# Patient Record
Sex: FEMALE | Race: BLACK OR AFRICAN AMERICAN | NOT HISPANIC OR LATINO | URBAN - METROPOLITAN AREA
[De-identification: names, ages, dates, MRNs, and addresses within clinical notes are randomized per-mention and may not be internally consistent; named-entity substitution may affect disease eponyms.]

---

## 2024-04-02 PROBLEM — Z00.00 ENCOUNTER FOR PREVENTIVE HEALTH EXAMINATION: Status: ACTIVE | Noted: 2024-04-02

## 2024-04-03 ENCOUNTER — OUTPATIENT (OUTPATIENT)
Dept: OUTPATIENT SERVICES | Facility: HOSPITAL | Age: 54
LOS: 1 days | End: 2024-04-03
Payer: COMMERCIAL

## 2024-04-03 ENCOUNTER — APPOINTMENT (OUTPATIENT)
Dept: MRI IMAGING | Facility: IMAGING CENTER | Age: 54
End: 2024-04-03
Payer: COMMERCIAL

## 2024-04-03 ENCOUNTER — APPOINTMENT (OUTPATIENT)
Dept: MAMMOGRAPHY | Facility: IMAGING CENTER | Age: 54
End: 2024-04-03
Payer: COMMERCIAL

## 2024-04-03 DIAGNOSIS — Z00.8 ENCOUNTER FOR OTHER GENERAL EXAMINATION: ICD-10-CM

## 2024-04-03 PROCEDURE — G0279: CPT

## 2024-04-03 PROCEDURE — 77066 DX MAMMO INCL CAD BI: CPT

## 2024-04-03 PROCEDURE — 77066 DX MAMMO INCL CAD BI: CPT | Mod: 26

## 2024-04-03 PROCEDURE — 70551 MRI BRAIN STEM W/O DYE: CPT | Mod: 26

## 2024-04-03 PROCEDURE — 70551 MRI BRAIN STEM W/O DYE: CPT

## 2024-04-03 PROCEDURE — G0279: CPT | Mod: 26

## 2024-04-04 ENCOUNTER — APPOINTMENT (OUTPATIENT)
Dept: ULTRASOUND IMAGING | Facility: IMAGING CENTER | Age: 54
End: 2024-04-04
Payer: COMMERCIAL

## 2024-04-04 ENCOUNTER — APPOINTMENT (OUTPATIENT)
Dept: MAMMOGRAPHY | Facility: IMAGING CENTER | Age: 54
End: 2024-04-04
Payer: COMMERCIAL

## 2024-04-04 ENCOUNTER — OUTPATIENT (OUTPATIENT)
Dept: OUTPATIENT SERVICES | Facility: HOSPITAL | Age: 54
LOS: 1 days | End: 2024-04-04
Payer: COMMERCIAL

## 2024-04-04 DIAGNOSIS — Z00.8 ENCOUNTER FOR OTHER GENERAL EXAMINATION: ICD-10-CM

## 2024-04-04 PROCEDURE — 77066 DX MAMMO INCL CAD BI: CPT

## 2024-04-04 PROCEDURE — G0279: CPT | Mod: 26

## 2024-04-04 PROCEDURE — 77066 DX MAMMO INCL CAD BI: CPT | Mod: 26

## 2024-04-04 PROCEDURE — G0279: CPT

## 2024-04-04 PROCEDURE — 76641 ULTRASOUND BREAST COMPLETE: CPT | Mod: 26,50

## 2024-04-04 PROCEDURE — 76641 ULTRASOUND BREAST COMPLETE: CPT

## 2024-04-18 ENCOUNTER — OUTPATIENT (OUTPATIENT)
Dept: OUTPATIENT SERVICES | Facility: HOSPITAL | Age: 54
LOS: 1 days | End: 2024-04-18
Payer: COMMERCIAL

## 2024-04-18 ENCOUNTER — APPOINTMENT (OUTPATIENT)
Dept: CT IMAGING | Facility: IMAGING CENTER | Age: 54
End: 2024-04-18
Payer: COMMERCIAL

## 2024-04-18 ENCOUNTER — APPOINTMENT (OUTPATIENT)
Dept: ULTRASOUND IMAGING | Facility: IMAGING CENTER | Age: 54
End: 2024-04-18
Payer: COMMERCIAL

## 2024-04-18 DIAGNOSIS — R10.9 UNSPECIFIED ABDOMINAL PAIN: ICD-10-CM

## 2024-04-18 DIAGNOSIS — N63.0 UNSPECIFIED LUMP IN UNSPECIFIED BREAST: ICD-10-CM

## 2024-04-18 PROCEDURE — 88360 TUMOR IMMUNOHISTOCHEM/MANUAL: CPT | Mod: 26

## 2024-04-18 PROCEDURE — 88305 TISSUE EXAM BY PATHOLOGIST: CPT | Mod: 26

## 2024-04-18 PROCEDURE — 74176 CT ABD & PELVIS W/O CONTRAST: CPT

## 2024-04-18 PROCEDURE — 19083 BX BREAST 1ST LESION US IMAG: CPT | Mod: LT

## 2024-04-18 PROCEDURE — 77065 DX MAMMO INCL CAD UNI: CPT | Mod: 26,LT

## 2024-04-18 PROCEDURE — 19084 BX BREAST ADD LESION US IMAG: CPT | Mod: RT

## 2024-04-18 PROCEDURE — 19084 BX BREAST ADD LESION US IMAG: CPT

## 2024-04-18 PROCEDURE — 74176 CT ABD & PELVIS W/O CONTRAST: CPT | Mod: 26

## 2024-04-18 PROCEDURE — 77065 DX MAMMO INCL CAD UNI: CPT

## 2024-04-18 PROCEDURE — 77065 DX MAMMO INCL CAD UNI: CPT | Mod: 26,RT

## 2024-04-18 PROCEDURE — 88360 TUMOR IMMUNOHISTOCHEM/MANUAL: CPT

## 2024-04-18 PROCEDURE — 88305 TISSUE EXAM BY PATHOLOGIST: CPT

## 2024-04-18 PROCEDURE — 19083 BX BREAST 1ST LESION US IMAG: CPT

## 2024-04-23 LAB — SURGICAL PATHOLOGY STUDY: SIGNIFICANT CHANGE UP

## 2024-04-24 ENCOUNTER — NON-APPOINTMENT (OUTPATIENT)
Age: 54
End: 2024-04-24

## 2024-04-30 ENCOUNTER — NON-APPOINTMENT (OUTPATIENT)
Age: 54
End: 2024-04-30

## 2024-05-01 ENCOUNTER — APPOINTMENT (OUTPATIENT)
Dept: SURGICAL ONCOLOGY | Facility: CLINIC | Age: 54
End: 2024-05-01
Payer: COMMERCIAL

## 2024-05-01 ENCOUNTER — NON-APPOINTMENT (OUTPATIENT)
Age: 54
End: 2024-05-01

## 2024-05-01 VITALS
DIASTOLIC BLOOD PRESSURE: 86 MMHG | HEART RATE: 80 BPM | SYSTOLIC BLOOD PRESSURE: 130 MMHG | BODY MASS INDEX: 26.68 KG/M2 | WEIGHT: 170 LBS | OXYGEN SATURATION: 99 % | HEIGHT: 67 IN

## 2024-05-01 PROCEDURE — 99205 OFFICE O/P NEW HI 60 MIN: CPT

## 2024-05-01 NOTE — ADDENDUM
[FreeTextEntry1] : I, Selina Becker, acted solely as a scribe for Dr. Sp Ellis on this date 05/01/2024.

## 2024-05-01 NOTE — HISTORY OF PRESENT ILLNESS
[de-identified] : Ms. ANGELINE VAN is a 53 year old female who present today for initial consultation for b/l breast cancer. She reports she was told in [place] that her imaging was benign years ago.   Right retroareolar 9:00 biopsy 4/19/24: Invasive well-differentiated duct carcinoma. Measures at least 6mm. DCIS, low nuclear grade. receptors pending  Left breast 8:00 N3 biopsy 4/19/24: Invasive moderately differentiated lobular carcinoma, classical type. Measures at least 9mm. LCIS, classic type, with calcifications. receptors pending  B/L mammo/sono 4/5/24: Highly suspicious b/l masses in the right retroareolar region and left breast 8:00. US biopsies are recommended. BIRADS 5  B/L mammo 4/4/24: TC 23.68%. Architectural distortion in the right retroareolar region and questionable architectural distortion in the lower inner left breast and additional medial left breast nodularity. BIRADS 0   PMH: b/l breast cyst aspiration  Family History: breast cancer in maternal aunt.

## 2024-05-01 NOTE — PHYSICAL EXAM
[de-identified] : us shows b/l breast cancers recently biopsied, left 8:00 and right 9:00 as well as other multiple solid and cystic nodules, right 9:00 mass is palpable in the retroaroelar location, left 12:00 palpable mass likely secondary to multiple cysts/fibrocystic changes.

## 2024-05-01 NOTE — ASSESSMENT
[FreeTextEntry1] : Bilateral invasive breast cancers  ER/TX/Her 2 pending  I had a long discussion with the pt and her  regarding her diagnosis, prognosis and all management options I feel that the pt will require b/l mastectomies  w/ reconstruction but she is interested in breast conservation if possible so we have agreed to proceed with breast MRI to asses the extent of disease  Plan to discuss surgical approach once MRI is reviewed   Surgical procedures discussed in detail including lumpectomy + RT vs mastectomy w/ reconstruction  Adjuvant systemic tx discussed as well All questions answered

## 2024-05-03 ENCOUNTER — OUTPATIENT (OUTPATIENT)
Dept: OUTPATIENT SERVICES | Facility: HOSPITAL | Age: 54
LOS: 1 days | End: 2024-05-03
Payer: COMMERCIAL

## 2024-05-03 ENCOUNTER — APPOINTMENT (OUTPATIENT)
Dept: MRI IMAGING | Facility: IMAGING CENTER | Age: 54
End: 2024-05-03
Payer: COMMERCIAL

## 2024-05-03 DIAGNOSIS — C50.919 MALIGNANT NEOPLASM OF UNSPECIFIED SITE OF UNSPECIFIED FEMALE BREAST: ICD-10-CM

## 2024-05-03 PROCEDURE — 77049 MRI BREAST C-+ W/CAD BI: CPT | Mod: 26

## 2024-05-03 PROCEDURE — C8937: CPT

## 2024-05-03 PROCEDURE — C8908: CPT

## 2024-05-08 ENCOUNTER — NON-APPOINTMENT (OUTPATIENT)
Age: 54
End: 2024-05-08

## 2024-05-17 ENCOUNTER — RESULT REVIEW (OUTPATIENT)
Age: 54
End: 2024-05-17

## 2024-05-17 ENCOUNTER — APPOINTMENT (OUTPATIENT)
Dept: ULTRASOUND IMAGING | Facility: IMAGING CENTER | Age: 54
End: 2024-05-17
Payer: COMMERCIAL

## 2024-05-17 ENCOUNTER — OUTPATIENT (OUTPATIENT)
Dept: OUTPATIENT SERVICES | Facility: HOSPITAL | Age: 54
LOS: 1 days | End: 2024-05-17
Payer: COMMERCIAL

## 2024-05-17 DIAGNOSIS — C50.919 MALIGNANT NEOPLASM OF UNSPECIFIED SITE OF UNSPECIFIED FEMALE BREAST: ICD-10-CM

## 2024-05-17 PROCEDURE — 19083 BX BREAST 1ST LESION US IMAG: CPT | Mod: LT

## 2024-05-17 PROCEDURE — 19286 PERQ DEV BREAST ADD US IMAG: CPT | Mod: RT

## 2024-05-17 PROCEDURE — 19286 PERQ DEV BREAST ADD US IMAG: CPT

## 2024-05-17 PROCEDURE — 19285 PERQ DEV BREAST 1ST US IMAG: CPT | Mod: 59,LT

## 2024-05-17 PROCEDURE — 77065 DX MAMMO INCL CAD UNI: CPT | Mod: 26,RT

## 2024-05-17 PROCEDURE — 77065 DX MAMMO INCL CAD UNI: CPT

## 2024-05-17 PROCEDURE — 77065 DX MAMMO INCL CAD UNI: CPT | Mod: 26,LT

## 2024-05-17 PROCEDURE — 19083 BX BREAST 1ST LESION US IMAG: CPT

## 2024-05-17 PROCEDURE — 88305 TISSUE EXAM BY PATHOLOGIST: CPT | Mod: 26

## 2024-05-17 PROCEDURE — 19285 PERQ DEV BREAST 1ST US IMAG: CPT

## 2024-05-17 PROCEDURE — 88305 TISSUE EXAM BY PATHOLOGIST: CPT

## 2024-05-17 PROCEDURE — A4648: CPT

## 2024-05-21 ENCOUNTER — OUTPATIENT (OUTPATIENT)
Dept: OUTPATIENT SERVICES | Facility: HOSPITAL | Age: 54
LOS: 1 days | End: 2024-05-21
Payer: COMMERCIAL

## 2024-05-21 VITALS
HEART RATE: 68 BPM | SYSTOLIC BLOOD PRESSURE: 122 MMHG | WEIGHT: 175.93 LBS | TEMPERATURE: 98 F | DIASTOLIC BLOOD PRESSURE: 85 MMHG | RESPIRATION RATE: 16 BRPM | HEIGHT: 67 IN | OXYGEN SATURATION: 100 %

## 2024-05-21 DIAGNOSIS — C50.919 MALIGNANT NEOPLASM OF UNSPECIFIED SITE OF UNSPECIFIED FEMALE BREAST: ICD-10-CM

## 2024-05-21 DIAGNOSIS — Z98.890 OTHER SPECIFIED POSTPROCEDURAL STATES: Chronic | ICD-10-CM

## 2024-05-21 LAB
GIANT PLATELETS BLD QL SMEAR: PRESENT — SIGNIFICANT CHANGE UP
HCG SERPL-ACNC: <1 MIU/ML — SIGNIFICANT CHANGE UP
HCT VFR BLD CALC: 40.5 % — SIGNIFICANT CHANGE UP (ref 34.5–45)
HGB BLD-MCNC: 12.5 G/DL — SIGNIFICANT CHANGE UP (ref 11.5–15.5)
MANUAL SMEAR VERIFICATION: SIGNIFICANT CHANGE UP
MCHC RBC-ENTMCNC: 25.4 PG — LOW (ref 27–34)
MCHC RBC-ENTMCNC: 30.9 GM/DL — LOW (ref 32–36)
MCV RBC AUTO: 82.3 FL — SIGNIFICANT CHANGE UP (ref 80–100)
NRBC # BLD: 0 /100 WBCS — SIGNIFICANT CHANGE UP (ref 0–0)
NRBC # FLD: 0 K/UL — SIGNIFICANT CHANGE UP (ref 0–0)
PLAT MORPH BLD: ABNORMAL
PLATELET # BLD AUTO: 59 K/UL — LOW (ref 150–400)
PLATELET COUNT - ESTIMATE: ABNORMAL
RBC # BLD: 4.92 M/UL — SIGNIFICANT CHANGE UP (ref 3.8–5.2)
RBC # FLD: 19.3 % — HIGH (ref 10.3–14.5)
RBC BLD AUTO: NORMAL — SIGNIFICANT CHANGE UP
WBC # BLD: 7.84 K/UL — SIGNIFICANT CHANGE UP (ref 3.8–10.5)
WBC # FLD AUTO: 7.84 K/UL — SIGNIFICANT CHANGE UP (ref 3.8–10.5)

## 2024-05-21 RX ORDER — SODIUM CHLORIDE 9 MG/ML
1000 INJECTION, SOLUTION INTRAVENOUS
Refills: 0 | Status: DISCONTINUED | OUTPATIENT
Start: 2024-05-28 | End: 2024-06-11

## 2024-05-21 NOTE — H&P PST ADULT - ADDITIONAL PE
DX: malignant neoplasm of female breast; DX: right breast mass at 9 o'clock, firm, nontender, no nipple discharge, left breast biopsy site with steri strips in place, malignant neoplasm of female breast;

## 2024-05-21 NOTE — H&P PST ADULT - NS HP PST ANES REACTION
[FreeTextEntry8] : Pt is here to  have work form filled out. Patient reports feeling well today, denies any acute complaints. 
No

## 2024-05-21 NOTE — H&P PST ADULT - PROBLEM SELECTOR PLAN 1
pt scheduled for bilateral lumpectomy mag seed localization bilateral axillary sentinel lymph node biopsy on 05/28/24  Preop instructions provided. Pt verbalized understanding.   Pepcid for GI prophylaxis with written and verbal instruction provided    written and verbal instructions with teach back on chlorhexidine shampoo provided,  pt verbalized understanding   h/o anemia, CBC done at PST, Hcg done @PST

## 2024-05-21 NOTE — H&P PST ADULT - NSICDXFAMILYHX_GEN_ALL_CORE_FT
FAMILY HISTORY:  No pertinent family history in first degree relatives FAMILY HISTORY:  Aunt  Still living? Unknown  FH: breast cancer, Age at diagnosis: Age Unknown

## 2024-05-21 NOTE — H&P PST ADULT - HISTORY OF PRESENT ILLNESS
53 y.o. female with h/o anemia, presents to PST for evaluation scheduled for bilateral lumpectomy mag seed localization bilateral axillary sentinel lymph node biopsy, reports h/lo abnormal mammogram in 04/2024, followed by breast ultrasound, repeat mammogram, breast MRI and biopsy, preop diagnosis malignant neoplasm of female breast, denies breast tenderness, nipple discharge, fatigue, weight loss 53 y.o. female with h/o anemia, presents to [facility] for evaluation scheduled for bilateral lumpectomy mag seed localization bilateral axillary sentinel lymph node biopsy, reports noted bilateral breast lump, s/p abnormal mammogram in 04/2024, followed by breast ultrasound, repeat mammogram, breast MRI and biopsy, preop diagnosis malignant neoplasm of female breast, denies breast tenderness, nipple discharge, fatigue, weight loss

## 2024-05-24 PROBLEM — C50.919 MALIGNANT NEOPLASM OF UNSPECIFIED SITE OF UNSPECIFIED FEMALE BREAST: Chronic | Status: ACTIVE | Noted: 2024-05-21

## 2024-05-24 PROBLEM — D64.9 ANEMIA, UNSPECIFIED: Chronic | Status: ACTIVE | Noted: 2024-05-21

## 2024-05-27 ENCOUNTER — TRANSCRIPTION ENCOUNTER (OUTPATIENT)
Age: 54
End: 2024-05-27

## 2024-05-28 ENCOUNTER — OUTPATIENT (OUTPATIENT)
Dept: OUTPATIENT SERVICES | Facility: HOSPITAL | Age: 54
LOS: 1 days | Discharge: HOME | End: 2024-05-28
Payer: COMMERCIAL

## 2024-05-28 ENCOUNTER — APPOINTMENT (OUTPATIENT)
Dept: NUCLEAR MEDICINE | Facility: HOSPITAL | Age: 54
End: 2024-05-28

## 2024-05-28 ENCOUNTER — RESULT REVIEW (OUTPATIENT)
Age: 54
End: 2024-05-28

## 2024-05-28 ENCOUNTER — APPOINTMENT (OUTPATIENT)
Dept: SURGICAL ONCOLOGY | Facility: HOSPITAL | Age: 54
End: 2024-05-28

## 2024-05-28 ENCOUNTER — TRANSCRIPTION ENCOUNTER (OUTPATIENT)
Age: 54
End: 2024-05-28

## 2024-05-28 VITALS
RESPIRATION RATE: 14 BRPM | WEIGHT: 175.93 LBS | TEMPERATURE: 99 F | HEART RATE: 71 BPM | SYSTOLIC BLOOD PRESSURE: 107 MMHG | HEIGHT: 67 IN | OXYGEN SATURATION: 100 % | DIASTOLIC BLOOD PRESSURE: 69 MMHG

## 2024-05-28 VITALS
OXYGEN SATURATION: 99 % | SYSTOLIC BLOOD PRESSURE: 123 MMHG | DIASTOLIC BLOOD PRESSURE: 81 MMHG | RESPIRATION RATE: 15 BRPM | HEART RATE: 83 BPM

## 2024-05-28 DIAGNOSIS — C50.919 MALIGNANT NEOPLASM OF UNSPECIFIED SITE OF UNSPECIFIED FEMALE BREAST: ICD-10-CM

## 2024-05-28 DIAGNOSIS — Z98.890 OTHER SPECIFIED POSTPROCEDURAL STATES: Chronic | ICD-10-CM

## 2024-05-28 LAB — HCG UR QL: NEGATIVE — SIGNIFICANT CHANGE UP

## 2024-05-28 PROCEDURE — 14001 TIS TRNFR TRUNK 10.1-30SQCM: CPT

## 2024-05-28 PROCEDURE — 19301 PARTIAL MASTECTOMY: CPT | Mod: 50

## 2024-05-28 PROCEDURE — 76098 X-RAY EXAM SURGICAL SPECIMEN: CPT | Mod: 26,76

## 2024-05-28 PROCEDURE — 88305 TISSUE EXAM BY PATHOLOGIST: CPT | Mod: 26

## 2024-05-28 PROCEDURE — 88307 TISSUE EXAM BY PATHOLOGIST: CPT | Mod: 26

## 2024-05-28 PROCEDURE — 38900 IO MAP OF SENT LYMPH NODE: CPT | Mod: 50

## 2024-05-28 PROCEDURE — 88342 IMHCHEM/IMCYTCHM 1ST ANTB: CPT | Mod: 26

## 2024-05-28 PROCEDURE — 38792 RA TRACER ID OF SENTINL NODE: CPT | Mod: 50,59

## 2024-05-28 PROCEDURE — 38525 BIOPSY/REMOVAL LYMPH NODES: CPT | Mod: 50

## 2024-05-28 DEVICE — SURGICEL FIBRILLAR 2 X 4": Type: IMPLANTABLE DEVICE | Site: BILATERAL | Status: FUNCTIONAL

## 2024-05-28 DEVICE — ARISTA 3GR: Type: IMPLANTABLE DEVICE | Site: BILATERAL | Status: FUNCTIONAL

## 2024-05-28 RX ORDER — OXYCODONE HYDROCHLORIDE 5 MG/1
10 TABLET ORAL ONCE
Refills: 0 | Status: DISCONTINUED | OUTPATIENT
Start: 2024-05-28 | End: 2024-05-28

## 2024-05-28 RX ORDER — OXYCODONE HYDROCHLORIDE 5 MG/1
1 TABLET ORAL
Qty: 24 | Refills: 0
Start: 2024-05-28 | End: 2024-05-31

## 2024-05-28 RX ORDER — HYDROMORPHONE HYDROCHLORIDE 2 MG/ML
0.5 INJECTION INTRAMUSCULAR; INTRAVENOUS; SUBCUTANEOUS
Refills: 0 | Status: DISCONTINUED | OUTPATIENT
Start: 2024-05-28 | End: 2024-05-28

## 2024-05-28 RX ORDER — ONDANSETRON 8 MG/1
4 TABLET, FILM COATED ORAL ONCE
Refills: 0 | Status: DISCONTINUED | OUTPATIENT
Start: 2024-05-28 | End: 2024-06-11

## 2024-05-28 RX ORDER — OXYCODONE HYDROCHLORIDE 5 MG/1
5 TABLET ORAL ONCE
Refills: 0 | Status: DISCONTINUED | OUTPATIENT
Start: 2024-05-28 | End: 2024-05-28

## 2024-05-28 RX ORDER — HYDROMORPHONE HYDROCHLORIDE 2 MG/ML
1 INJECTION INTRAMUSCULAR; INTRAVENOUS; SUBCUTANEOUS
Refills: 0 | Status: DISCONTINUED | OUTPATIENT
Start: 2024-05-28 | End: 2024-05-28

## 2024-05-28 RX ADMIN — OXYCODONE HYDROCHLORIDE 5 MILLIGRAM(S): 5 TABLET ORAL at 20:45

## 2024-05-28 RX ADMIN — SODIUM CHLORIDE 30 MILLILITER(S): 9 INJECTION, SOLUTION INTRAVENOUS at 18:56

## 2024-05-28 RX ADMIN — OXYCODONE HYDROCHLORIDE 5 MILLIGRAM(S): 5 TABLET ORAL at 20:00

## 2024-05-28 NOTE — ASU DISCHARGE PLAN (ADULT/PEDIATRIC) - NS MD DC FALL RISK RISK
For information on Fall & Injury Prevention, visit: [email or web address] OR  [email or web address] OR  https://www.cdc.gov/steadi/patient.html

## 2024-05-28 NOTE — BRIEF OPERATIVE NOTE - NSICDXBRIEFPROCEDURE_GEN_ALL_CORE_FT
PROCEDURES:  Lumpectomy, breast, bilateral, with sentinel lymph node biopsy 28-May-2024 18:15:03  Ariel Sharma

## 2024-05-28 NOTE — ASU DISCHARGE PLAN (ADULT/PEDIATRIC) - ASU DC SPECIAL INSTRUCTIONSFT
Breast Biopsy/Lumpectomy Post-operative Instructions    Supportive bra-  You should wear the supportive bra continuously for 1 week after the procedure, including wearing it to sleep.  Thereafter, you may wear your regular bra.  You may remove the bra to shower.  The sports bra will provide support, decrease the amount of swelling at the lumpectomy site, and make your recovery more comfortable.    Showering/Bathing- You may shower starting today. Allow the water to run over the wound, but don not scrub the area.  It is best not to sit in a bathtub or swimming pool for at least one week after surgery.    Activity level- You may resume most normal daily activity as tolerated, but avoid strenuous activities such as aerobics, jogging, exercising or heavy lifting for at least 1 week after surgery.  You may return to work in 1-2 days after surgery.  You may drive as long as you are not taking any prescription pain medication.    Pain Medication- You may take extra-strength Tylenol as needed. Take oxycodone 5mg every 4-6 hours for severe pain. Please don't take aspirin, Motrin, Advil or any other anti-inflammatory medications, as these medications may cause bleeding or bruising.    Follow-up Appointment- Please call the office to schedule your post-operative appointment which should be approximately 10 days after your surgery.     Bruising/Bleeding/Swelling- It is normal for there to be some bruising and swelling at the breast biopsy site, and there may be some staining of blood on to the dressing.  Some discomfort at the surgical site is expected.  If your symptoms seem excessive, or if you have any question or concerns, please call the office.

## 2024-05-28 NOTE — ASU DISCHARGE PLAN (ADULT/PEDIATRIC) - CARE PROVIDER_API CALL
Sp Ellis [facility]  [address] [place] [id number]  Phone: [phone]  Fax: [phone]  Established Patient  Follow Up Time: 2 weeks

## 2024-05-28 NOTE — ASU DISCHARGE PLAN (ADULT/PEDIATRIC) - NURSING INSTRUCTIONS
You received IV Tylenol for pain management at 3:15pm. Please DO NOT take any Tylenol (Acetaminophen) containing products, such as Vicodin, Percocet, Excedrin, and cold medications for the next 6 hours (until 9:15 PM). DO NOT TAKE MORE THAN 3000 MG OF TYLENOL in a 24 hour period.

## 2024-06-04 ENCOUNTER — TRANSCRIPTION ENCOUNTER (OUTPATIENT)
Age: 54
End: 2024-06-04

## 2024-06-05 ENCOUNTER — APPOINTMENT (OUTPATIENT)
Dept: SURGICAL ONCOLOGY | Facility: CLINIC | Age: 54
End: 2024-06-05
Payer: COMMERCIAL

## 2024-06-05 VITALS
OXYGEN SATURATION: 98 % | HEART RATE: 76 BPM | DIASTOLIC BLOOD PRESSURE: 86 MMHG | SYSTOLIC BLOOD PRESSURE: 134 MMHG | WEIGHT: 170 LBS | HEIGHT: 67 IN | BODY MASS INDEX: 26.68 KG/M2

## 2024-06-05 PROCEDURE — 99024 POSTOP FOLLOW-UP VISIT: CPT

## 2024-06-05 NOTE — CONSULT LETTER
[Dear  ___] : Dear  [unfilled], [Consult Letter:] : I had the pleasure of evaluating your patient, [unfilled]. [Please see my note below.] : Please see my note below. [Sincerely,] : Sincerely, [FreeTextEntry3] : Sp Ellis MD FACS

## 2024-06-05 NOTE — HISTORY OF PRESENT ILLNESS
[de-identified] : Ms. ANGELINE VAN is a 53 year old female who presents a post op for for b/l breast cancer. Today she presents a post op visit for bilateral lumpectomies on 5/28/24. Pathology is pending.   Breast MRI 5/3/24- Bilateral enhancing breast masses with associated biopsy markers corresponding to known breast cancers. Additional suspicious mass in the lower inner posterior left breast, 2.0 cm inferior to index carcinoma at 8:00, to be included within a wide excision. If it would alter management, additional ultrasound guided core biopsy at the 8:00 position can be performed.   She reports she was told in [place] that her imaging was benign years ago.   Right retroareolar 9:00 biopsy 4/19/24: Invasive well-differentiated duct carcinoma. Measures at least 6mm. DCIS, low nuclear grade. receptors pending  Left breast 8:00 N3 biopsy 4/19/24: Invasive moderately differentiated lobular carcinoma, classical type. Measures at least 9mm. LCIS, classic type, with calcifications. receptors pending  B/L mammo/sono 4/5/24: Highly suspicious b/l masses in the right retroareolar region and left breast 8:00. US biopsies are recommended. BIRADS 5  B/L mammo 4/4/24: TC 23.68%. Architectural distortion in the right retroareolar region and questionable architectural distortion in the lower inner left breast and additional medial left breast nodularity. BIRADS 0   PMH: b/l breast cyst aspiration  Family History: breast cancer in maternal aunt.

## 2024-06-05 NOTE — ASSESSMENT
[FreeTextEntry1] : Bilateral invasive breast cancers  S/p b/l lumpectomies/SN bx Follow up with pathology Discussed the risk for re-excision given extent of disease Adjuvant radiation therapy and systemic tx discussed RTO 3 months or prn pending path

## 2024-06-05 NOTE — PHYSICAL EXAM
[Normal] : supple, no neck mass and thyroid not enlarged [Normal Neck Lymph Nodes] : normal neck lymph nodes  [Normal Supraclavicular Lymph Nodes] : normal supraclavicular lymph nodes [Normal Groin Lymph Nodes] : normal groin lymph nodes [Normal Axillary Lymph Nodes] : normal axillary lymph nodes [Normal] : oriented to person, place and time, with appropriate affect [de-identified] : bilateral breast lumpectomy and axillary incisions healing well w/ no evidence of infection

## 2024-06-05 NOTE — ADDENDUM
[FreeTextEntry1] : I, Selina Becker, acted solely as a scribe for Dr. Sp Ellis on this date 06/05/2024.

## 2024-06-06 LAB — SURGICAL PATHOLOGY STUDY: SIGNIFICANT CHANGE UP

## 2024-06-17 ENCOUNTER — NON-APPOINTMENT (OUTPATIENT)
Age: 54
End: 2024-06-17

## 2024-06-20 ENCOUNTER — OUTPATIENT (OUTPATIENT)
Dept: OUTPATIENT SERVICES | Facility: HOSPITAL | Age: 54
LOS: 1 days | Discharge: HOME | End: 2024-06-20
Payer: COMMERCIAL

## 2024-06-20 DIAGNOSIS — Z98.890 OTHER SPECIFIED POSTPROCEDURAL STATES: Chronic | ICD-10-CM

## 2024-06-24 ENCOUNTER — NON-APPOINTMENT (OUTPATIENT)
Age: 54
End: 2024-06-24

## 2024-06-24 ENCOUNTER — APPOINTMENT (OUTPATIENT)
Dept: RADIATION ONCOLOGY | Facility: CLINIC | Age: 54
End: 2024-06-24
Payer: COMMERCIAL

## 2024-06-24 VITALS
DIASTOLIC BLOOD PRESSURE: 79 MMHG | OXYGEN SATURATION: 100 % | RESPIRATION RATE: 17 BRPM | WEIGHT: 184.5 LBS | SYSTOLIC BLOOD PRESSURE: 125 MMHG | BODY MASS INDEX: 28.96 KG/M2 | TEMPERATURE: 97 F | HEIGHT: 67 IN | HEART RATE: 69 BPM

## 2024-06-24 DIAGNOSIS — E61.1 IRON DEFICIENCY: ICD-10-CM

## 2024-06-24 DIAGNOSIS — Z80.3 FAMILY HISTORY OF MALIGNANT NEOPLASM OF BREAST: ICD-10-CM

## 2024-06-24 DIAGNOSIS — Z23 ENCOUNTER FOR IMMUNIZATION: ICD-10-CM

## 2024-06-24 DIAGNOSIS — C50.919 MALIGNANT NEOPLASM OF UNSPECIFIED SITE OF UNSPECIFIED FEMALE BREAST: ICD-10-CM

## 2024-06-24 PROCEDURE — 99205 OFFICE O/P NEW HI 60 MIN: CPT | Mod: GC

## 2024-06-24 RX ORDER — OXYCODONE 5 MG/1
5 TABLET ORAL
Qty: 24 | Refills: 0 | Status: DISCONTINUED | COMMUNITY
Start: 2024-05-28

## 2024-06-24 RX ORDER — IRON/IRON ASP GLY/FA/MV-MIN 38 125-25-1MG
TABLET ORAL
Refills: 0 | Status: ACTIVE | COMMUNITY

## 2024-07-16 ENCOUNTER — NON-APPOINTMENT (OUTPATIENT)
Age: 54
End: 2024-07-16

## 2024-07-17 ENCOUNTER — APPOINTMENT (OUTPATIENT)
Dept: HEMATOLOGY ONCOLOGY | Facility: CLINIC | Age: 54
End: 2024-07-17
Payer: COMMERCIAL

## 2024-07-17 VITALS
HEART RATE: 74 BPM | HEIGHT: 67.32 IN | SYSTOLIC BLOOD PRESSURE: 127 MMHG | OXYGEN SATURATION: 99 % | DIASTOLIC BLOOD PRESSURE: 82 MMHG | BODY MASS INDEX: 27.7 KG/M2 | WEIGHT: 178.57 LBS | TEMPERATURE: 97.2 F | RESPIRATION RATE: 16 BRPM

## 2024-07-17 DIAGNOSIS — C50.919 MALIGNANT NEOPLASM OF UNSPECIFIED SITE OF UNSPECIFIED FEMALE BREAST: ICD-10-CM

## 2024-07-17 PROCEDURE — G2211 COMPLEX E/M VISIT ADD ON: CPT

## 2024-07-17 PROCEDURE — 99205 OFFICE O/P NEW HI 60 MIN: CPT

## 2024-07-24 ENCOUNTER — APPOINTMENT (OUTPATIENT)
Dept: HEMATOLOGY ONCOLOGY | Facility: CLINIC | Age: 54
End: 2024-07-24

## 2024-07-26 DIAGNOSIS — C50.811 MALIGNANT NEOPLASM OF OVERLAPPING SITES OF RIGHT FEMALE BREAST: ICD-10-CM

## 2024-07-26 DIAGNOSIS — C50.912 MALIGNANT NEOPLASM OF UNSPECIFIED SITE OF LEFT FEMALE BREAST: ICD-10-CM

## 2024-07-29 ENCOUNTER — NON-APPOINTMENT (OUTPATIENT)
Age: 54
End: 2024-07-29

## 2024-07-29 PROCEDURE — 77333 RADIATION TREATMENT AID(S): CPT | Mod: 26

## 2024-07-29 PROCEDURE — 77263 THER RADIOLOGY TX PLNG CPLX: CPT

## 2024-07-29 PROCEDURE — 77290 THER RAD SIMULAJ FIELD CPLX: CPT | Mod: 26

## 2024-07-31 ENCOUNTER — APPOINTMENT (OUTPATIENT)
Dept: HEMATOLOGY ONCOLOGY | Facility: CLINIC | Age: 54
End: 2024-07-31

## 2024-07-31 VITALS
BODY MASS INDEX: 28.21 KG/M2 | DIASTOLIC BLOOD PRESSURE: 69 MMHG | TEMPERATURE: 98.1 F | RESPIRATION RATE: 15 BRPM | OXYGEN SATURATION: 98 % | HEART RATE: 61 BPM | WEIGHT: 181.88 LBS | SYSTOLIC BLOOD PRESSURE: 104 MMHG

## 2024-07-31 DIAGNOSIS — C50.919 MALIGNANT NEOPLASM OF UNSPECIFIED SITE OF UNSPECIFIED FEMALE BREAST: ICD-10-CM

## 2024-07-31 PROCEDURE — G2211 COMPLEX E/M VISIT ADD ON: CPT

## 2024-07-31 PROCEDURE — 99214 OFFICE O/P EST MOD 30 MIN: CPT

## 2024-07-31 RX ORDER — TAMOXIFEN CITRATE 20 MG/1
20 TABLET, FILM COATED ORAL DAILY
Qty: 1 | Refills: 1 | Status: ACTIVE | COMMUNITY
Start: 2024-07-31 | End: 1900-01-01

## 2024-08-05 PROCEDURE — 77300 RADIATION THERAPY DOSE PLAN: CPT | Mod: 26

## 2024-08-05 PROCEDURE — 77334 RADIATION TREATMENT AID(S): CPT | Mod: 26

## 2024-08-05 PROCEDURE — 77295 3-D RADIOTHERAPY PLAN: CPT | Mod: 26

## 2024-08-08 NOTE — ASSESSMENT
[FreeTextEntry1] : In summary, Ms. ANGELINE VAN is a 53 year old premenopausal female with bilateral stage IA breast ca. She is status post bilateral lumpectomies 5/2024 and has met with Dr Harp. L breast: Invasive lobular carcinoma, classic type, pT1bN0. Oncotype 10 R breast- Invasive ductal carcinoma, well-differentiated, pT1cN0. Oncotype 10  - I discussed the treatment for stage I breast cancer with the patient including the role of chemotherapy, radiation therapy and endocrine therapy. I discussed the role of Oncotype DX recurrence score in deciding the benefit from chemotherapy as well as its prognostic significance if the score is <10.   - Both right and left lumpectomy specimens resulted with an Oncotype of 10. Discussed patient will not need chemotherapy. Planning for bilateral breast RT with Dr. Harp. Can proceed with RT then will plan to start tamoxifen 2 weeks following RT.   - will discuss genetics at next visit, due to BL cancers and young age  I discussed the side effects of tamoxifen including but not limited to hot flashes, mood changes, fluid retention, vaginal dryness. cataracts, thromboembolic events, stroke, cardiovascular side effects and the risk of endometrial cancer.   I reviewed that tamoxifen may stop her periods or can cause irregular bleeding. She is done with child bearing. I reviewed teratogenic effects of tamoxifen. She will continue to use non hormonal  barrier contraception to prevent pregnancy. No OCP use. She will followup with her gynecologist every 6-12 months and report any unusual vaginal bleeding or spotting. I recommended her to see an ophthalmologist annually for cataract monitoring. Patient is a nonsmoker and has no cardiovascular risk factors. She has no personal or family history of coagulation disorders.  She will continue to follow with her primary care physician for evaluation and management of risk factors for stroke. I educated about signs and symptoms of DVT/PE. Patient will report if she develops acute onset chest pain shortness of breath, leg swelling or calf pain. Rec to take ASA 81. She understands the thrombotic risk and will hold tamoxifen prior to surgery or prolonged travel.  After understanding the risks and benefits of tamoxifen, patient has decided to start tamoxifen. She will continue annual mammogram. I will see her back in 3 months or sooner if she develops any side effects The patient had plenty of time to ask questions and all of her questions were answered to her satisfaction. I gave her my office phone number and encouraged her to call with any questions or additional information.   - RTC 4 months

## 2024-08-08 NOTE — HISTORY OF PRESENT ILLNESS
[ECOG Performance Status: 0 - Fully active, able to carry on all pre-disease performance without restriction] : Performance Status: 0 - Fully active, able to carry on all pre-disease performance without restriction [de-identified] : In summary, Ms. ANGELINE VAN is a 53 year old premenopausal female with bilateral stage IA breast ca. She is status post bilateral lumpectomies 5/2024 and has met with Dr Harp. L breast: Invasive lobular carcinoma, classic type, pT1bN0. Oncotype 10 R breast- Invasive ductal carcinoma, well-differentiated, pT1cN0. Oncotype 10  Patient presents today for a routine follow-up. She is here to review her Oncotype Dx recurrence score.  RS low on both sides no benefit from chemo rec tamoxifen 20 mg daily x 10 yrs s/e reviewed plan to start davis after RT will discuss genetics at next visit, due to BL cancers and young age  [de-identified] : Ms. Kuhn is a 53 year old woman presenting to us today with newly diagnosed Anatomic/Prognostic Stage IA, L breast pT1bN0 Invasive lobular carcinoma and R breast pT1cN0 Invasive ductal carcinoma. Here for consideration of RT.  Patient had benign breast imaging years prior in [place].  B/L mammo 4/4/24: TC 23.68%. Architectural distortion in the right retroareolar region and questionable architectural distortion in the lower inner left breast and additional medial left breast nodularity 2.5 cm in size.  B/L mammo/sono 4/5/24: Highly suspicious b/l masses in the right retroareolar region and left breast 8:00. US biopsies are recommended. BIRADS 5  Right retroareolar 9:00 biopsy 4/19/24: Invasive well-differentiated duct carcinoma ER/VT +ve, HER2 -ve. [place] 5. Measures at least 6mm. DCIS, low nuclear grade. Left breast 8:00 N3 biopsy 4/19/24: Invasive moderately differentiated lobular carcinoma ER/VT +ve, HER2 -ve, classical type. [place] 6. Measures at least 9mm. LCIS, classic type, with calcifications.  Breast MRI 5/3/24- Bilateral enhancing breast masses with associated biopsy markers corresponding to known breast cancers. Additional suspicious mass in the lower inner posterior left breast, 2.0 cm inferior to index carcinoma at 8:00  5/29 Bilateral lumpectomies: L breast 8:00 lumpectomy- Invasive lobular carcinoma, classic type, moderately-differentiated, 9 mm in size. grade 2, no LVI, margins negative. 0/5 sentinel lymph nodes +ve. LCIS with calcifications, LCIS at margins. pT1bN0 R breast 9:00 lumpectomy- Invasive ductal carcinoma, well-differentiated, 18 mm in size. grade 1, no LVI, margins negative. 0/4 sentinel nodes +ve. DCIS, cribriform and micropapillary types, intermediate grade. pT1cN0.  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  She has irregular and heavy bleeding, periods LMP 7/2024 Iron def anemia brca rec.

## 2024-08-08 NOTE — HISTORY OF PRESENT ILLNESS
[ECOG Performance Status: 0 - Fully active, able to carry on all pre-disease performance without restriction] : Performance Status: 0 - Fully active, able to carry on all pre-disease performance without restriction [de-identified] : In summary, Ms. ANGELINE VAN is a 53 year old premenopausal female with bilateral stage IA breast ca. She is status post bilateral lumpectomies 5/2024 and has met with Dr Harp. L breast: Invasive lobular carcinoma, classic type, pT1bN0. Oncotype 10 R breast- Invasive ductal carcinoma, well-differentiated, pT1cN0. Oncotype 10  Patient presents today for a routine follow-up. She is here to review her Oncotype Dx recurrence score.  RS low on both sides no benefit from chemo rec tamoxifen 20 mg daily x 10 yrs s/e reviewed plan to start davis after RT will discuss genetics at next visit, due to BL cancers and young age  [de-identified] : Ms. Kuhn is a 53 year old woman presenting to us today with newly diagnosed Anatomic/Prognostic Stage IA, L breast pT1bN0 Invasive lobular carcinoma and R breast pT1cN0 Invasive ductal carcinoma. Here for consideration of RT.  Patient had benign breast imaging years prior in [place].  B/L mammo 4/4/24: TC 23.68%. Architectural distortion in the right retroareolar region and questionable architectural distortion in the lower inner left breast and additional medial left breast nodularity 2.5 cm in size.  B/L mammo/sono 4/5/24: Highly suspicious b/l masses in the right retroareolar region and left breast 8:00. US biopsies are recommended. BIRADS 5  Right retroareolar 9:00 biopsy 4/19/24: Invasive well-differentiated duct carcinoma ER/NC +ve, HER2 -ve. [place] 5. Measures at least 6mm. DCIS, low nuclear grade. Left breast 8:00 N3 biopsy 4/19/24: Invasive moderately differentiated lobular carcinoma ER/NC +ve, HER2 -ve, classical type. [place] 6. Measures at least 9mm. LCIS, classic type, with calcifications.  Breast MRI 5/3/24- Bilateral enhancing breast masses with associated biopsy markers corresponding to known breast cancers. Additional suspicious mass in the lower inner posterior left breast, 2.0 cm inferior to index carcinoma at 8:00  5/29 Bilateral lumpectomies: L breast 8:00 lumpectomy- Invasive lobular carcinoma, classic type, moderately-differentiated, 9 mm in size. grade 2, no LVI, margins negative. 0/5 sentinel lymph nodes +ve. LCIS with calcifications, LCIS at margins. pT1bN0 R breast 9:00 lumpectomy- Invasive ductal carcinoma, well-differentiated, 18 mm in size. grade 1, no LVI, margins negative. 0/4 sentinel nodes +ve. DCIS, cribriform and micropapillary types, intermediate grade. pT1cN0.  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

## 2024-08-08 NOTE — PHYSICAL EXAM
[Fully active, able to carry on all pre-disease performance without restriction] : Status 0 - Fully active, able to carry on all pre-disease performance without restriction [Normal] : affect appropriate [de-identified] : BL healing lumpectomy scars

## 2024-08-08 NOTE — PHYSICAL EXAM
[Fully active, able to carry on all pre-disease performance without restriction] : Status 0 - Fully active, able to carry on all pre-disease performance without restriction [Normal] : affect appropriate [de-identified] : BL healing lumpectomy scars

## 2024-08-12 ENCOUNTER — NON-APPOINTMENT (OUTPATIENT)
Age: 54
End: 2024-08-12

## 2024-08-12 PROCEDURE — 77280 THER RAD SIMULAJ FIELD SMPL: CPT | Mod: 26

## 2024-08-13 PROCEDURE — 77387C: CUSTOM

## 2024-08-13 PROCEDURE — 77427 RADIATION TX MANAGEMENT X5: CPT

## 2024-08-13 NOTE — PHYSICAL EXAM
[Normal] : normal skin color and pigmentation and no rash [de-identified] : The breasts symmetric in size, shape, and contour. There is a well healed bilateral medial circumareolar and axillary scars. The breasts are without palpable masses, nipple discharge or suspicious skin changes.

## 2024-08-13 NOTE — DISEASE MANAGEMENT
[Pathological] : TNM Stage: p [IA] : IA [TTNM] : 1b/c [NTNM] : 0 [MTNM] : 0 [de-identified] : 283 [de-identified] : [id number] [de-identified] : Bilateral breasts

## 2024-08-13 NOTE — HISTORY OF PRESENT ILLNESS
[FreeTextEntry1] : Ms. Srinivasa Bhat is a 53-year-old woman diagnosed with Anatomic and Prognostic Stage IA pT1bN0 invasive lobular carcinoma of the left lower inner quadrant of the breast ER+ 90% ID+ 99% HER2- and synchronous Anatomic and Prognostic Stage IA pT1cN0 invasive ductal carcinoma of the right retroareolar breast ER+ 90% ID+ 99% HER2- s/p bilateral lumpectomy and SLNB on 5/28/24. Pathology for the left breast yielded invasive lobular carcinoma present as two foci measuring 9 and 5mm, grade 2, -EIC, -LVSI, margins negative, 0/5 lymph nodes. Pathology for the right breast yielded invasive ductal carcinoma, grade 1, 1.8 cm, associated with intermediate grade DCIS, -EIC, -LVSI, margins negative, 0/4 lymph nodes. Oncotype Dx RS 10 low on both sides. No chemotherapy recommended. Planned for Tamoxifen 20 mg daily x 10 yrs after radiation.  8/13/2024 She presents for on treatment visit. Completed 1/15 fractions. Skin care and symptom management reviewed.

## 2024-08-14 PROCEDURE — 77387C: CUSTOM

## 2024-08-15 PROCEDURE — 77387C: CUSTOM

## 2024-08-16 PROCEDURE — 77387C: CUSTOM

## 2024-08-19 ENCOUNTER — NON-APPOINTMENT (OUTPATIENT)
Age: 54
End: 2024-08-19

## 2024-08-19 PROCEDURE — 77387C: CUSTOM

## 2024-08-20 PROCEDURE — 77387C: CUSTOM

## 2024-08-20 NOTE — DISEASE MANAGEMENT
[TTNM] : 1b/c [NTNM] : 0 [MTNM] : 0 [de-identified] : 4175 [de-identified] : [id number] [de-identified] : Bilateral breasts

## 2024-08-20 NOTE — HISTORY OF PRESENT ILLNESS
[FreeTextEntry1] : Ms. Srinivasa Bhat is a 53-year-old woman diagnosed with Anatomic and Prognostic Stage IA pT1bN0 invasive lobular carcinoma of the left lower inner quadrant of the breast ER+ 90% UT+ 99% HER2- and synchronous Anatomic and Prognostic Stage IA pT1cN0 invasive ductal carcinoma of the right retroareolar breast ER+ 90% UT+ 99% HER2- s/p bilateral lumpectomy and SLNB on 5/28/24. Pathology for the left breast yielded invasive lobular carcinoma present as two foci measuring 9 and 5mm, grade 2, -EIC, -LVSI, margins negative, 0/5 lymph nodes. Pathology for the right breast yielded invasive ductal carcinoma, grade 1, 1.8 cm, associated with intermediate grade DCIS, -EIC, -LVSI, margins negative, 0/4 lymph nodes. Oncotype Dx RS 10 low on both sides. No chemotherapy recommended. Planned for Tamoxifen 20 mg daily x 10 yrs after radiation.  8/13/2024 She presents for on treatment visit. Completed 1/15 fractions. Skin care and symptom management reviewed.  8/19/2024 She presents for on treatment visit. Completed 5/15 fractions

## 2024-08-20 NOTE — DISEASE MANAGEMENT
[TTNM] : 1b/c [NTNM] : 0 [MTNM] : 0 [de-identified] : 1271 [de-identified] : [id number] [de-identified] : Bilateral breasts

## 2024-08-20 NOTE — HISTORY OF PRESENT ILLNESS
[FreeTextEntry1] : Ms. Srinivasa Bhat is a 53-year-old woman diagnosed with Anatomic and Prognostic Stage IA pT1bN0 invasive lobular carcinoma of the left lower inner quadrant of the breast ER+ 90% MD+ 99% HER2- and synchronous Anatomic and Prognostic Stage IA pT1cN0 invasive ductal carcinoma of the right retroareolar breast ER+ 90% MD+ 99% HER2- s/p bilateral lumpectomy and SLNB on 5/28/24. Pathology for the left breast yielded invasive lobular carcinoma present as two foci measuring 9 and 5mm, grade 2, -EIC, -LVSI, margins negative, 0/5 lymph nodes. Pathology for the right breast yielded invasive ductal carcinoma, grade 1, 1.8 cm, associated with intermediate grade DCIS, -EIC, -LVSI, margins negative, 0/4 lymph nodes. Oncotype Dx RS 10 low on both sides. No chemotherapy recommended. Planned for Tamoxifen 20 mg daily x 10 yrs after radiation.  8/13/2024 She presents for on treatment visit. Completed 1/15 fractions. Skin care and symptom management reviewed.  8/19/2024 She presents for on treatment visit. Completed 5/15 fractions

## 2024-08-20 NOTE — PHYSICAL EXAM
[de-identified] : The breasts symmetric in size, shape, and contour. There is a well healed bilateral medial circumareolar and axillary scars. The breasts are without palpable masses, nipple discharge or suspicious skin changes.

## 2024-08-20 NOTE — PHYSICAL EXAM
[de-identified] : The breasts symmetric in size, shape, and contour. There is a well healed bilateral medial circumareolar and axillary scars. The breasts are without palpable masses, nipple discharge or suspicious skin changes.

## 2024-08-21 PROCEDURE — 77387C: CUSTOM

## 2024-08-22 PROCEDURE — 77387C: CUSTOM

## 2024-08-23 PROCEDURE — 77387C: CUSTOM

## 2024-08-26 PROCEDURE — 77387C: CUSTOM

## 2024-08-27 ENCOUNTER — NON-APPOINTMENT (OUTPATIENT)
Age: 54
End: 2024-08-27

## 2024-08-27 PROCEDURE — 77387C: CUSTOM

## 2024-08-27 PROCEDURE — 77427 RADIATION TX MANAGEMENT X5: CPT

## 2024-08-27 NOTE — HISTORY OF PRESENT ILLNESS
[FreeTextEntry1] : Ms. Srinivasa Bhat is a 53-year-old woman diagnosed with Anatomic and Prognostic Stage IA pT1bN0 invasive lobular carcinoma of the left lower inner quadrant of the breast ER+ 90% DE+ 99% HER2- and synchronous Anatomic and Prognostic Stage IA pT1cN0 invasive ductal carcinoma of the right retroareolar breast ER+ 90% DE+ 99% HER2- s/p bilateral lumpectomy and SLNB on 5/28/24. Pathology for the left breast yielded invasive lobular carcinoma present as two foci measuring 9 and 5mm, grade 2, -EIC, -LVSI, margins negative, 0/5 lymph nodes. Pathology for the right breast yielded invasive ductal carcinoma, grade 1, 1.8 cm, associated with intermediate grade DCIS, -EIC, -LVSI, margins negative, 0/4 lymph nodes. Oncotype Dx RS 10 low on both sides. No chemotherapy recommended. Planned for Tamoxifen 20 mg daily x 10 yrs after radiation.  8/13/2024 She presents for on treatment visit. Completed 1/15 fractions. Skin care and symptom management reviewed.  8/19/2024 She presents for on treatment visit. Completed 5/15 fractions.  8/27/2024 She presents for on treatment visit. Completed 11/15 fractions. Faint hyperpigmentation to b/l breasts.

## 2024-08-27 NOTE — DISEASE MANAGEMENT
[Pathological] : TNM Stage: p [IA] : IA [TTNM] : 1b/c [NTNM] : 0 [MTNM] : 0 [de-identified] : 9955 [de-identified] : [id number] [de-identified] : Bilateral breasts

## 2024-08-27 NOTE — PHYSICAL EXAM
[Normal] : normal skin color and pigmentation and no rash [] : no rash [de-identified] : The breasts symmetric in size, shape, and contour. There is a well healed bilateral medial circumareolar and axillary scars. The breasts are without palpable masses, nipple discharge or suspicious skin changes.   [de-identified] : mild bilateral breast hyperpigmentation

## 2024-08-27 NOTE — DISEASE MANAGEMENT
[Pathological] : TNM Stage: p [IA] : IA [TTNM] : 1b/c [NTNM] : 0 [MTNM] : 0 [de-identified] : 9543 [de-identified] : [id number] [de-identified] : Bilateral breasts

## 2024-08-27 NOTE — HISTORY OF PRESENT ILLNESS
[FreeTextEntry1] : Ms. Srinivasa Bhat is a 53-year-old woman diagnosed with Anatomic and Prognostic Stage IA pT1bN0 invasive lobular carcinoma of the left lower inner quadrant of the breast ER+ 90% MI+ 99% HER2- and synchronous Anatomic and Prognostic Stage IA pT1cN0 invasive ductal carcinoma of the right retroareolar breast ER+ 90% MI+ 99% HER2- s/p bilateral lumpectomy and SLNB on 5/28/24. Pathology for the left breast yielded invasive lobular carcinoma present as two foci measuring 9 and 5mm, grade 2, -EIC, -LVSI, margins negative, 0/5 lymph nodes. Pathology for the right breast yielded invasive ductal carcinoma, grade 1, 1.8 cm, associated with intermediate grade DCIS, -EIC, -LVSI, margins negative, 0/4 lymph nodes. Oncotype Dx RS 10 low on both sides. No chemotherapy recommended. Planned for Tamoxifen 20 mg daily x 10 yrs after radiation.  8/13/2024 She presents for on treatment visit. Completed 1/15 fractions. Skin care and symptom management reviewed.  8/19/2024 She presents for on treatment visit. Completed 5/15 fractions.  8/27/2024 She presents for on treatment visit. Completed 11/15 fractions. Faint hyperpigmentation to b/l breasts.

## 2024-08-27 NOTE — PHYSICAL EXAM
[Normal] : normal skin color and pigmentation and no rash [] : no rash [de-identified] : The breasts symmetric in size, shape, and contour. There is a well healed bilateral medial circumareolar and axillary scars. The breasts are without palpable masses, nipple discharge or suspicious skin changes.   [de-identified] : mild bilateral breast hyperpigmentation

## 2024-08-27 NOTE — HISTORY OF PRESENT ILLNESS
[FreeTextEntry1] : Ms. Srinivasa Bhat is a 53-year-old woman diagnosed with Anatomic and Prognostic Stage IA pT1bN0 invasive lobular carcinoma of the left lower inner quadrant of the breast ER+ 90% VA+ 99% HER2- and synchronous Anatomic and Prognostic Stage IA pT1cN0 invasive ductal carcinoma of the right retroareolar breast ER+ 90% VA+ 99% HER2- s/p bilateral lumpectomy and SLNB on 5/28/24. Pathology for the left breast yielded invasive lobular carcinoma present as two foci measuring 9 and 5mm, grade 2, -EIC, -LVSI, margins negative, 0/5 lymph nodes. Pathology for the right breast yielded invasive ductal carcinoma, grade 1, 1.8 cm, associated with intermediate grade DCIS, -EIC, -LVSI, margins negative, 0/4 lymph nodes. Oncotype Dx RS 10 low on both sides. No chemotherapy recommended. Planned for Tamoxifen 20 mg daily x 10 yrs after radiation.  8/13/2024 She presents for on treatment visit. Completed 1/15 fractions. Skin care and symptom management reviewed.  8/19/2024 She presents for on treatment visit. Completed 5/15 fractions.  8/27/2024 She presents for on treatment visit. Completed 11/15 fractions. Faint hyperpigmentation to b/l breasts.

## 2024-08-27 NOTE — PHYSICAL EXAM
Controlled with current regime [Normal] : normal skin color and pigmentation and no rash [] : no rash [de-identified] : The breasts symmetric in size, shape, and contour. There is a well healed bilateral medial circumareolar and axillary scars. The breasts are without palpable masses, nipple discharge or suspicious skin changes.   [de-identified] : mild bilateral breast hyperpigmentation

## 2024-08-27 NOTE — DISEASE MANAGEMENT
[Pathological] : TNM Stage: p [IA] : IA [TTNM] : 1b/c [NTNM] : 0 [MTNM] : 0 [de-identified] : 8022 [de-identified] : [id number] [de-identified] : Bilateral breasts

## 2024-08-28 PROCEDURE — 77387C: CUSTOM

## 2024-08-29 PROCEDURE — 77387C: CUSTOM

## 2024-08-30 PROCEDURE — 77387C: CUSTOM

## 2024-09-03 PROCEDURE — 77387C: CUSTOM

## 2024-09-10 ENCOUNTER — NON-APPOINTMENT (OUTPATIENT)
Age: 54
End: 2024-09-10

## 2024-09-11 ENCOUNTER — APPOINTMENT (OUTPATIENT)
Dept: SURGICAL ONCOLOGY | Facility: CLINIC | Age: 54
End: 2024-09-11
Payer: COMMERCIAL

## 2024-09-11 VITALS
OXYGEN SATURATION: 99 % | HEART RATE: 70 BPM | WEIGHT: 175.5 LBS | HEIGHT: 67 IN | BODY MASS INDEX: 27.55 KG/M2 | SYSTOLIC BLOOD PRESSURE: 120 MMHG | RESPIRATION RATE: 16 BRPM | DIASTOLIC BLOOD PRESSURE: 78 MMHG

## 2024-09-11 DIAGNOSIS — C50.919 MALIGNANT NEOPLASM OF UNSPECIFIED SITE OF UNSPECIFIED FEMALE BREAST: ICD-10-CM

## 2024-09-11 PROCEDURE — 99215 OFFICE O/P EST HI 40 MIN: CPT

## 2024-09-11 NOTE — ASSESSMENT
[FreeTextEntry1] : Stage IA bilateral invasive breast cancers  S/p b/l lumpectomies/SN bx Completed adjuvant radiation therapy Pt will begin endocrine therapy with Tamoxifen as per med[facility]  RTO 3 months

## 2024-09-11 NOTE — ADDENDUM
[FreeTextEntry1] : I, Selina Becker, acted solely as a scribe for Dr. Sp Ellis on this date 09/11/2024.

## 2024-09-11 NOTE — HISTORY OF PRESENT ILLNESS
[de-identified] : Ms. ANGELINE VAN is a 53 year old female who presents a f/u for for b/l stage IA breast cancer. S/p bilateral lumpectomies on 5/28/24.  She completed adjuvant XRT 8/27/24.  She is scheduled to begin Tamoxifen in 2 weeks as per Dr. Lamas of med[facility].  FInal path 5/28/24- 1. [place] lymph nodes, right axilla, excision:- Four lymph nodes, negative for carcinoma (0/4) 2. [place] lymph nodes, left axilla, excision:- Five lymph nodes, negative for carcinoma (0/5) 3. Breast, left, 8:00, lumpectomy:- Invasive lobular carcinoma, classic type, moderately-differentiated, grade 2     (3+2+1) - Invasive carcinoma is present as two foci measuring 9 mm and 5 mm - Lymphovascular invasion is not identified Additional findings: - Lobular carcinoma in situ (LCIS), classic type - Fibrocystic changes - Calcifications identified in LCIS - Biopsy site changes 4. Breast, left, superior margin, excision:- Lobular carcinoma in situ (LCIS), classic type- Benign breast tissue 5. Breast, left, medial margin, excision:- Lobular carcinoma in situ (LCIS), classic type- Benign breast tissue 6. Breast, left, inferior margin, excision:- Lobular carcinoma in situ (LCIS), classic type- Benign breast tissue 7. Breast, left, lateral margin, excision:- Benign breast tissue 8. Breast, left, deep margin, excision:- Lobular carcinoma in situ (LCIS), classic type- Benign breast tissue 9. Breast, left, anterior margin, excision:- Benign breast tissue 10. Breast, right, 9:00, lumpectomy:- Invasive ductal carcinoma, well-differentiated, Grade 1 (1+2+1) - Invasive carcinoma measures 18 mm in greatest dimension - Lymphovascular invasion is not identified - Ductal carcinoma in situ (DCIS), cribriform and micropapillary types with     intermediate nuclear grade Additional findings: - Lobular carcinoma in situ (LCIS), classic type - Fibrocystic changes - Intraductal papillomas - Radial scar - Calcifications identified in benign epithelium - Biopsy site changes 11. Breast, right, superior margin, excision:- Benign breast tissue 12. Breast, right, medial margin, excision:- Benign breast tissue 13. Breast, right, inferior margin, excision:- Benign breast tissue 14. Breast, right lateral margin, excision:- Benign breast tissue 15. Breast, right, deep margin, excision:- Benign breast tissue 16. Breast, right, anterior margin, excision:- Benign breast tissue - Biopsy site changes   Breast MRI 5/3/24- Bilateral enhancing breast masses with associated biopsy markers corresponding to known breast cancers. Additional suspicious mass in the lower inner posterior left breast, 2.0 cm inferior to index carcinoma at 8:00, to be included within a wide excision. If it would alter management, additional ultrasound guided core biopsy at the 8:00 position can be performed.  She reports she was told in [place] that her imaging was benign years ago.   Right retroareolar 9:00 biopsy 4/19/24: Invasive well-differentiated duct carcinoma. Measures at least 6mm. DCIS, low nuclear grade. receptors pending  Left breast 8:00 N3 biopsy 4/19/24: Invasive moderately differentiated lobular carcinoma, classical type. Measures at least 9mm. LCIS, classic type, with calcifications. receptors pending  B/L mammo/sono 4/5/24: Highly suspicious b/l masses in the right retroareolar region and left breast 8:00. US biopsies are recommended. BIRADS 5  B/L mammo 4/4/24: TC 23.68%. Architectural distortion in the right retroareolar region and questionable architectural distortion in the lower inner left breast and additional medial left breast nodularity. BIRADS 0   PMH: b/l breast cyst aspiration  Family History: breast cancer in maternal aunt.

## 2024-09-11 NOTE — PHYSICAL EXAM
[Normal] : supple, no neck mass and thyroid not enlarged [Normal Neck Lymph Nodes] : normal neck lymph nodes  [Normal Supraclavicular Lymph Nodes] : normal supraclavicular lymph nodes [Normal Groin Lymph Nodes] : normal groin lymph nodes [Normal Axillary Lymph Nodes] : normal axillary lymph nodes [Normal] : oriented to person, place and time, with appropriate affect [de-identified] : bilateral breast lumpectomy scars well healed w/ post radiation changes bilaterally

## 2024-10-04 ENCOUNTER — APPOINTMENT (OUTPATIENT)
Dept: RADIATION ONCOLOGY | Facility: CLINIC | Age: 54
End: 2024-10-04
Payer: COMMERCIAL

## 2024-10-04 VITALS
WEIGHT: 175 LBS | TEMPERATURE: 96.98 F | RESPIRATION RATE: 17 BRPM | BODY MASS INDEX: 27.47 KG/M2 | HEIGHT: 67 IN | OXYGEN SATURATION: 100 % | HEART RATE: 65 BPM | SYSTOLIC BLOOD PRESSURE: 125 MMHG | DIASTOLIC BLOOD PRESSURE: 80 MMHG

## 2024-10-04 DIAGNOSIS — C50.919 MALIGNANT NEOPLASM OF UNSPECIFIED SITE OF UNSPECIFIED FEMALE BREAST: ICD-10-CM

## 2024-10-04 PROCEDURE — 99024 POSTOP FOLLOW-UP VISIT: CPT

## 2024-10-04 NOTE — HISTORY OF PRESENT ILLNESS
[FreeTextEntry1] : Ms. Srinivasa Bhat is a 53-year-old woman diagnosed with Anatomic and Prognostic Stage IA pT1bN0 invasive lobular carcinoma of the left lower inner quadrant of the breast ER+ 90% MO+ 99% HER2- and synchronous Anatomic and Prognostic Stage IA pT1cN0 invasive ductal carcinoma of the right retroareolar breast ER+ 90% MO+ 99% HER2- s/p bilateral lumpectomy and SLNB on 5/28/24. Pathology for the left breast yielded invasive lobular carcinoma present as two foci measuring 9 and 5mm, grade 2, -EIC, -LVSI, margins negative, 0/5 lymph nodes. Pathology for the right breast yielded invasive ductal carcinoma, grade 1, 1.8 cm, associated with intermediate grade DCIS, -EIC, -LVSI, margins negative, 0/4 lymph nodes. Oncotype Dx RS 10 low on both sides. No chemotherapy recommended. Planned for Tamoxifen 20 mg daily x 10 yrs after radiation.  9/3/2024 She completed radiation therapy to a total dose of 4005 cGy in 15 fractions to the Bilateral breasts.  10/4/2024 She presents for post treatment evaluation. While on treatment, she developed grade1 hyperpigmentation to both breasts managed with Aquaphor. Will follow up with Dr Lamas in November and Dr Ellis in December Planned to start Tamoxifen.

## 2024-10-04 NOTE — PHYSICAL EXAM
[Heart Rate And Rhythm] : heart rate and rhythm were normal [Normal] : normal spine exam without palpable tenderness, no kyphosis or scoliosis [Musculoskeletal - Swelling] : no joint swelling [] : no rash [Skin Lesions] : no skin lesions [No Focal Deficits] : no focal deficits [Oriented To Time, Place, And Person] : oriented to person, place, and time [Sensation] : the sensory exam was normal to light touch and pinprick [de-identified] : The breasts symmetric in size, shape, and contour. There is a well healed bilateral medial circumareolar and axillary scars. The breasts are without palpable masses, nipple discharge or suspicious skin changes.   [Affect] : the affect was normal [de-identified] : Moderate hyperpigmentation of the bilateral breasts

## 2024-10-04 NOTE — HISTORY OF PRESENT ILLNESS
[FreeTextEntry1] : Ms. Srinivasa Bhat is a 53-year-old woman diagnosed with Anatomic and Prognostic Stage IA pT1bN0 invasive lobular carcinoma of the left lower inner quadrant of the breast ER+ 90% WA+ 99% HER2- and synchronous Anatomic and Prognostic Stage IA pT1cN0 invasive ductal carcinoma of the right retroareolar breast ER+ 90% WA+ 99% HER2- s/p bilateral lumpectomy and SLNB on 5/28/24. Pathology for the left breast yielded invasive lobular carcinoma present as two foci measuring 9 and 5mm, grade 2, -EIC, -LVSI, margins negative, 0/5 lymph nodes. Pathology for the right breast yielded invasive ductal carcinoma, grade 1, 1.8 cm, associated with intermediate grade DCIS, -EIC, -LVSI, margins negative, 0/4 lymph nodes. Oncotype Dx RS 10 low on both sides. No chemotherapy recommended. Planned for Tamoxifen 20 mg daily x 10 yrs after radiation.  9/3/2024 She completed radiation therapy to a total dose of 4005 cGy in 15 fractions to the Bilateral breasts.  10/4/2024 She presents for post treatment evaluation. While on treatment, she developed grade1 hyperpigmentation to both breasts managed with Aquaphor. Will follow up with Dr Lamas in November and Dr Ellis in December Planned to start Tamoxifen.

## 2024-10-04 NOTE — PHYSICAL EXAM
[Heart Rate And Rhythm] : heart rate and rhythm were normal [Normal] : normal spine exam without palpable tenderness, no kyphosis or scoliosis [Musculoskeletal - Swelling] : no joint swelling [] : no rash [Skin Lesions] : no skin lesions [No Focal Deficits] : no focal deficits [Oriented To Time, Place, And Person] : oriented to person, place, and time [Sensation] : the sensory exam was normal to light touch and pinprick [de-identified] : The breasts symmetric in size, shape, and contour. There is a well healed bilateral medial circumareolar and axillary scars. The breasts are without palpable masses, nipple discharge or suspicious skin changes.   [Affect] : the affect was normal [de-identified] : Moderate hyperpigmentation of the bilateral breasts

## 2024-10-04 NOTE — REVIEW OF SYSTEMS
[Negative] : Allergic/Immunologic [Skin Hyperpigmentation: Grade 1 - Hyperpigmentation covering <10% BSA; no psychosocial impact] : Skin Hyperpigmentation: Grade 1 - Hyperpigmentation covering <10% BSA; no psychosocial impact

## 2024-11-27 ENCOUNTER — RESULT REVIEW (OUTPATIENT)
Age: 54
End: 2024-11-27

## 2024-11-27 ENCOUNTER — APPOINTMENT (OUTPATIENT)
Dept: HEMATOLOGY ONCOLOGY | Facility: CLINIC | Age: 54
End: 2024-11-27
Payer: COMMERCIAL

## 2024-11-27 VITALS
OXYGEN SATURATION: 99 % | DIASTOLIC BLOOD PRESSURE: 79 MMHG | TEMPERATURE: 206.96 F | HEART RATE: 80 BPM | WEIGHT: 180.08 LBS | RESPIRATION RATE: 16 BRPM | BODY MASS INDEX: 28.2 KG/M2 | SYSTOLIC BLOOD PRESSURE: 131 MMHG

## 2024-11-27 DIAGNOSIS — C50.919 MALIGNANT NEOPLASM OF UNSPECIFIED SITE OF UNSPECIFIED FEMALE BREAST: ICD-10-CM

## 2024-11-27 PROCEDURE — G2211 COMPLEX E/M VISIT ADD ON: CPT

## 2024-11-27 PROCEDURE — 99214 OFFICE O/P EST MOD 30 MIN: CPT

## 2024-11-28 LAB
ALBUMIN SERPL ELPH-MCNC: 4.2 G/DL
ALP BLD-CCNC: 55 U/L
ALT SERPL-CCNC: 47 U/L
ANION GAP SERPL CALC-SCNC: 12 MMOL/L
AST SERPL-CCNC: 40 U/L
BILIRUB SERPL-MCNC: 0.2 MG/DL
BUN SERPL-MCNC: 9 MG/DL
CALCIUM SERPL-MCNC: 9 MG/DL
CHLORIDE SERPL-SCNC: 106 MMOL/L
CO2 SERPL-SCNC: 23 MMOL/L
CREAT SERPL-MCNC: 0.79 MG/DL
EGFR: 91 ML/MIN/1.73M2
GLUCOSE SERPL-MCNC: 73 MG/DL
POTASSIUM SERPL-SCNC: 3.9 MMOL/L
PROT SERPL-MCNC: 7 G/DL
SODIUM SERPL-SCNC: 141 MMOL/L

## 2024-12-05 ENCOUNTER — APPOINTMENT (OUTPATIENT)
Dept: HEMATOLOGY ONCOLOGY | Facility: CLINIC | Age: 54
End: 2024-12-05

## 2024-12-11 ENCOUNTER — APPOINTMENT (OUTPATIENT)
Dept: SURGICAL ONCOLOGY | Facility: CLINIC | Age: 54
End: 2024-12-11

## 2024-12-18 ENCOUNTER — APPOINTMENT (OUTPATIENT)
Dept: HEMATOLOGY ONCOLOGY | Facility: CLINIC | Age: 54
End: 2024-12-18

## 2025-01-02 ENCOUNTER — NON-APPOINTMENT (OUTPATIENT)
Age: 55
End: 2025-01-02

## 2025-01-23 ENCOUNTER — NON-APPOINTMENT (OUTPATIENT)
Age: 55
End: 2025-01-23

## 2025-01-23 ENCOUNTER — APPOINTMENT (OUTPATIENT)
Dept: OBGYN | Facility: CLINIC | Age: 55
End: 2025-01-23
Payer: COMMERCIAL

## 2025-01-23 VITALS
HEIGHT: 67 IN | DIASTOLIC BLOOD PRESSURE: 68 MMHG | SYSTOLIC BLOOD PRESSURE: 112 MMHG | WEIGHT: 172 LBS | BODY MASS INDEX: 27 KG/M2

## 2025-01-23 DIAGNOSIS — Z01.419 ENCOUNTER FOR GYNECOLOGICAL EXAMINATION (GENERAL) (ROUTINE) W/OUT ABNORMAL FINDINGS: ICD-10-CM

## 2025-01-23 PROCEDURE — 99386 PREV VISIT NEW AGE 40-64: CPT

## 2025-01-23 PROCEDURE — G0444 DEPRESSION SCREEN ANNUAL: CPT | Mod: 59

## 2025-01-25 LAB — HPV HIGH+LOW RISK DNA PNL CVX: NOT DETECTED

## 2025-01-27 LAB — CYTOLOGY CVX/VAG DOC THIN PREP: NORMAL

## 2025-01-30 ENCOUNTER — APPOINTMENT (OUTPATIENT)
Dept: OPHTHALMOLOGY | Facility: CLINIC | Age: 55
End: 2025-01-30
Payer: SELF-PAY

## 2025-01-30 ENCOUNTER — APPOINTMENT (OUTPATIENT)
Dept: OPHTHALMOLOGY | Facility: CLINIC | Age: 55
End: 2025-01-30
Payer: COMMERCIAL

## 2025-01-30 ENCOUNTER — NON-APPOINTMENT (OUTPATIENT)
Age: 55
End: 2025-01-30

## 2025-01-30 PROCEDURE — 92004 COMPRE OPH EXAM NEW PT 1/>: CPT

## 2025-01-30 PROCEDURE — 92250 FUNDUS PHOTOGRAPHY W/I&R: CPT

## 2025-01-30 PROCEDURE — 92015 DETERMINE REFRACTIVE STATE: CPT

## 2025-02-05 ENCOUNTER — APPOINTMENT (OUTPATIENT)
Dept: SURGICAL ONCOLOGY | Facility: CLINIC | Age: 55
End: 2025-02-05
Payer: COMMERCIAL

## 2025-02-05 DIAGNOSIS — C50.919 MALIGNANT NEOPLASM OF UNSPECIFIED SITE OF UNSPECIFIED FEMALE BREAST: ICD-10-CM

## 2025-02-05 PROCEDURE — 99215 OFFICE O/P EST HI 40 MIN: CPT | Mod: 25

## 2025-02-05 PROCEDURE — 10005 FNA BX W/US GDN 1ST LES: CPT

## 2025-02-06 LAB — FNA, BREAST: NORMAL

## 2025-02-13 ENCOUNTER — APPOINTMENT (OUTPATIENT)
Dept: OPHTHALMOLOGY | Facility: CLINIC | Age: 55
End: 2025-02-13
Payer: COMMERCIAL

## 2025-02-13 ENCOUNTER — NON-APPOINTMENT (OUTPATIENT)
Age: 55
End: 2025-02-13

## 2025-02-13 PROCEDURE — 92083 EXTENDED VISUAL FIELD XM: CPT

## 2025-02-13 PROCEDURE — 92133 CPTRZD OPH DX IMG PST SGM ON: CPT

## 2025-02-13 PROCEDURE — 92012 INTRM OPH EXAM EST PATIENT: CPT

## (undated) DEVICE — SUT VICRYL 2-0 18" TIES UNDYED

## (undated) DEVICE — ELCTR GROUNDING PAD ADULT COVIDIEN

## (undated) DEVICE — SUT VICRYL 3-0 27" SH UNDYED

## (undated) DEVICE — SYR LUER LOK 20CC

## (undated) DEVICE — WARMING BLANKET LOWER ADULT

## (undated) DEVICE — PACK MINOR WITH LAP

## (undated) DEVICE — GOWN LG

## (undated) DEVICE — SHEATH SURG GUIDE SCOUT DISP STRL

## (undated) DEVICE — LIGASURE SMALL JAW

## (undated) DEVICE — SOL IRR POUR H2O 500ML

## (undated) DEVICE — DRAPE TOWEL BLUE 17" X 24"

## (undated) DEVICE — DRSG STERISTRIPS 0.5 X 4"

## (undated) DEVICE — GLV 7.5 PROTEXIS (BLUE)

## (undated) DEVICE — SUT MONOCRYL 4-0 27" PS-2 UNDYED

## (undated) DEVICE — DRAPE 3/4 SHEET 52X76"

## (undated) DEVICE — VENODYNE/SCD SLEEVE CALF MEDIUM

## (undated) DEVICE — DRAPE LAPAROTOMY TRANSVERSE

## (undated) DEVICE — LABELS BLANK W PEN

## (undated) DEVICE — SYR LUER LOK 10CC

## (undated) DEVICE — POSITIONER STRAP ARMBOARD VELCRO TS-30

## (undated) DEVICE — DRAPE COVER SLEEVE GAMMA FINDER III

## (undated) DEVICE — GLV 7 PROTEXIS (WHITE)

## (undated) DEVICE — BASIN SET DOUBLE

## (undated) DEVICE — ELCTR BOVIE PENCIL SMOKE EVACUATION